# Patient Record
Sex: FEMALE | Race: WHITE | NOT HISPANIC OR LATINO | URBAN - NONMETROPOLITAN AREA
[De-identification: names, ages, dates, MRNs, and addresses within clinical notes are randomized per-mention and may not be internally consistent; named-entity substitution may affect disease eponyms.]

---

## 2018-08-07 PROBLEM — Z34.90 PREGNANT: Status: ACTIVE | Noted: 2018-08-07

## 2018-08-08 PROBLEM — Z3A.39 39 WEEKS GESTATION OF PREGNANCY: Status: ACTIVE | Noted: 2018-07-20

## 2018-08-09 PROBLEM — Z34.90 PREGNANT: Status: RESOLVED | Noted: 2018-08-07 | Resolved: 2018-08-09

## 2018-08-09 PROBLEM — Z98.891 PREVIOUS CESAREAN SECTION: Status: ACTIVE | Noted: 2018-08-09

## 2018-08-09 PROBLEM — O09.33 LIMITED PRENATAL CARE IN THIRD TRIMESTER: Status: ACTIVE | Noted: 2018-08-09

## 2018-08-09 PROBLEM — Z3A.39 39 WEEKS GESTATION OF PREGNANCY: Status: RESOLVED | Noted: 2018-07-20 | Resolved: 2018-08-09

## 2018-08-09 PROBLEM — O99.323: Status: ACTIVE | Noted: 2018-08-09

## 2023-12-04 ENCOUNTER — TRANSCRIBE ORDERS (OUTPATIENT)
Dept: ADMINISTRATIVE | Facility: HOSPITAL | Age: 43
End: 2023-12-04
Payer: COMMERCIAL

## 2023-12-04 DIAGNOSIS — R63.4 LOSS OF WEIGHT: Primary | ICD-10-CM

## 2023-12-18 ENCOUNTER — HOSPITAL ENCOUNTER (OUTPATIENT)
Facility: HOSPITAL | Age: 43
Discharge: HOME | End: 2023-12-18
Admitting: NURSE PRACTITIONER
Payer: COMMERCIAL

## 2023-12-18 DIAGNOSIS — R63.4 LOSS OF WEIGHT: ICD-10-CM

## 2023-12-18 PROCEDURE — 71250 CT THORAX DX C-: CPT

## 2024-05-13 ENCOUNTER — TRANSCRIBE ORDERS (OUTPATIENT)
Dept: ADMINISTRATIVE | Facility: HOSPITAL | Age: 44
End: 2024-05-13
Payer: COMMERCIAL

## 2024-05-13 DIAGNOSIS — R91.8 MULTIPLE PULMONARY NODULES: Primary | ICD-10-CM

## 2024-05-20 ENCOUNTER — HOSPITAL ENCOUNTER (OUTPATIENT)
Facility: HOSPITAL | Age: 44
Discharge: HOME | End: 2024-05-20
Admitting: STUDENT IN AN ORGANIZED HEALTH CARE EDUCATION/TRAINING PROGRAM
Payer: COMMERCIAL

## 2024-05-20 DIAGNOSIS — R91.8 MULTIPLE PULMONARY NODULES: ICD-10-CM

## 2024-05-20 PROCEDURE — 71250 CT THORAX DX C-: CPT

## 2024-06-17 ENCOUNTER — PATIENT OUTREACH (OUTPATIENT)
Dept: PULMONOLOGY | Facility: CLINIC | Age: 44
End: 2024-06-17
Payer: COMMERCIAL

## 2024-06-17 ENCOUNTER — OFFICE VISIT (OUTPATIENT)
Dept: PULMONOLOGY | Facility: CLINIC | Age: 44
End: 2024-06-17
Payer: COMMERCIAL

## 2024-06-17 VITALS
WEIGHT: 124 LBS | BODY MASS INDEX: 19.46 KG/M2 | DIASTOLIC BLOOD PRESSURE: 84 MMHG | TEMPERATURE: 98 F | RESPIRATION RATE: 18 BRPM | SYSTOLIC BLOOD PRESSURE: 120 MMHG | OXYGEN SATURATION: 98 % | HEART RATE: 76 BPM | HEIGHT: 67 IN

## 2024-06-17 DIAGNOSIS — R04.2 HEMOPTYSIS: ICD-10-CM

## 2024-06-17 DIAGNOSIS — Z72.0 TOBACCO ABUSE: ICD-10-CM

## 2024-06-17 DIAGNOSIS — R91.1 PULMONARY NODULE: Primary | ICD-10-CM

## 2024-06-17 PROCEDURE — 99204 OFFICE O/P NEW MOD 45 MIN: CPT | Performed by: INTERNAL MEDICINE

## 2024-06-17 RX ORDER — PANTOPRAZOLE SODIUM 40 MG/1
TABLET, DELAYED RELEASE ORAL
COMMUNITY
Start: 2024-05-29

## 2024-06-17 RX ORDER — BENZONATATE 200 MG/1
200 CAPSULE ORAL 3 TIMES DAILY PRN
COMMUNITY
Start: 2024-05-29

## 2024-06-17 NOTE — PROGRESS NOTES
PULMONARY  NOTE    Chief Complaint     Abnormal CT scan of the chest, hemoptysis, tobacco abuse    History of Present Illness     44-year-old female referred for an abnormal CT scan of the chest    She has a long history of tobacco abuse which is ongoing  She would like to eventually stop smoking but currently does not have plans for smoking cessation    She underwent a CT scan of the chest in May  Those results are as noted below  After that CT scan she had an episode of hemoptysis consisting of bright red blood  This occurred overnight and a little bit into the second day and then resolved with no recurrence    Currently on no regular respiratory medications  Currently no regular cough  She has been working on weight gain and has gained about 14 pounds since last year    Despite a diagnosis listed in her chart, she denies any history of IVDU    Patient Active Problem List   Diagnosis    Limited prenatal care in third trimester    Intravenous drug use during pregnancy in third trimester    Previous  section    Postpartum care following  delivery    Hemoptysis    Tobacco abuse    Pulmonary nodule (RUL incidental)      No Known Allergies    Current Outpatient Medications:     buprenorphine-naloxone (SUBOXONE) 8-2 MG per SL tablet, Place 1 tablet under the tongue Daily., Disp: , Rfl:     hydrochlorothiazide (HYDRODIURIL) 12.5 MG tablet, Take 1 tablet by mouth Daily., Disp: 30 tablet, Rfl: 0    benzonatate (TESSALON) 200 MG capsule, Take 1 capsule by mouth 3 (Three) Times a Day As Needed for Cough. (Patient not taking: Reported on 2024), Disp: , Rfl:     pantoprazole (PROTONIX) 40 MG EC tablet, TAKE ONE TABLET BY MOUTH TWO TIMES A DAY FOR STOMACH (Patient not taking: Reported on 2024), Disp: , Rfl:     Prenatal Vit-Fe Fumarate-FA (PRENATAL, CLASSIC, VITAMIN) 28-0.8 MG tablet tablet, Take 1 tablet by mouth Daily. (Patient not taking: Reported on 2024), Disp: , Rfl:   MEDICATION LIST AND  "ALLERGIES REVIEWED.    Family History   Problem Relation Age of Onset    Melanoma Father     Hypertension Father     Hypertension Mother     Coronary artery disease Paternal Grandfather     Hypertension Maternal Grandmother      Social History     Tobacco Use    Smoking status: Every Day     Current packs/day: 1.00     Average packs/day: 1 pack/day for 28.0 years (28.0 ttl pk-yrs)     Types: Cigarettes     Start date: 6/17/1996     Passive exposure: Current    Smokeless tobacco: Never   Vaping Use    Vaping status: Never Used   Substance Use Topics    Alcohol use: No    Drug use: Yes     Types: Oxycodone     Social History     Social History Narrative        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    Review of Systems  IF PRESENT REFER TO SCANNED ROS SHEET FROM SAME DATE  OTHERWISE ROS OBTAINED AND NON-CONTRIBUTORY OVER HPI.    /84   Pulse 76   Temp 98 °F (36.7 °C) (Temporal)   Resp 18   Ht 170.2 cm (67\")   Wt 56.2 kg (124 lb)   SpO2 98%   BMI 19.42 kg/m²   Physical Exam  Vitals and nursing note reviewed.   Constitutional:       General: She is not in acute distress.     Appearance: She is well-developed. She is not diaphoretic.   HENT:      Head: Normocephalic and atraumatic.   Neck:      Thyroid: No thyromegaly.   Cardiovascular:      Rate and Rhythm: Normal rate and regular rhythm.      Heart sounds: Normal heart sounds. No murmur heard.  Pulmonary:      Effort: Pulmonary effort is normal.      Breath sounds: Normal breath sounds. No stridor.   Lymphadenopathy:      Cervical: No cervical adenopathy.      Upper Body:      Right upper body: No supraclavicular or epitrochlear adenopathy.      Left upper body: No supraclavicular or epitrochlear adenopathy.   Skin:     General: Skin is warm and dry.   Neurological:      Mental Status: She is alert and oriented to person, place, and time. "   Psychiatric:         Behavior: Behavior normal.         Results     CT scan of the chest from May 2024 reviewed on PACS as well as CT scan of the chest from December 2023  Immunization History   Administered Date(s) Administered    COVID-19 (MODERNA) 1st,2nd,3rd Dose Monovalent 03/16/2021, 04/14/2021    COVID-19 (MODERNA) Monovalent Original Booster 12/17/2021     Problem List       ICD-10-CM ICD-9-CM   1. Pulmonary nodule (RUL incidental)  R91.1 793.11   2. Tobacco abuse  Z72.0 305.1   3. Hemoptysis  R04.2 786.30       Discussion     We reviewed her chest imaging on PACS  She has a new opacity in the right upper lobe medially that was not present in December 2023    She did have hemoptysis for less than a 24-hour period of time with no recurrence    Currently without symptoms suggestive of an active respiratory tract infection    We discussed workup options  I initially recommended a PET CT scan  However, she wanted to opt for serial chest imaging instead    Therefore, we will get a short interval CT scan of the chest and I will see her back at that time    We discussed smoking cessation strategies    Moderate level of Medical Decision Making complexity based on 1 undiagnosed new problem or 2 stable chronic conditions, independent interpretation of tests, and/or prescription drug management     Ludwin Vega MD  Note electronically signed    CC: Dyllan Dallas PA-C

## 2024-06-17 NOTE — SIGNIFICANT NOTE
Nurse Navigator met with patient on this date.  Patient was referred to lung nodule clinic to be seen in new patient consultation with Dr. Vega following an abnormal chest CT scan.  Dr. Vega's recommendation is for patient to complete a PET scan now; patient opted for a 3 month repeat chest CT scan instead (due 8/2024), and to follow up in OV after the scan.  The role of the NN was introduced to patient.  NN contact information was provided and encouraged for patient to call with any questions or concerns.  Patient verbalized understanding and is in agreement with her plan of care.

## 2025-05-02 ENCOUNTER — TRANSCRIBE ORDERS (OUTPATIENT)
Dept: ADMINISTRATIVE | Facility: HOSPITAL | Age: 45
End: 2025-05-02
Payer: COMMERCIAL

## 2025-05-02 DIAGNOSIS — R93.89 ABNORMAL CHEST CT: Primary | ICD-10-CM
